# Patient Record
(demographics unavailable — no encounter records)

---

## 2018-01-06 NOTE — EMERGENCY DEPARTMENT REPORT
HPI





- General


Chief Complaint: Upper Respiratory Infection


Time Seen by Provider: 18 12:05





- HPI


HPI: 








The patient is a  EGA 10 weeks, who presents for evaluation of cough and 

ill feeling.  The patient reports that nonproductive cough for the past one day

, harsh in quality, severe, and associated with nausea, decreased appetite, 

generalized myalgias, and fever.  The patient denies headache, neck stiffness, 

trauma to the chest, chest pain, syncope, hemoptysis, unilateral leg swelling, 

abdominal pain, vaginal bleeding, vaginal discharge, dysuria.








ED Past Medical Hx





- Past Medical History


Previous Medical History?: Yes





- Surgical History


Past Surgical History?: Yes


Additional Surgical History: 





- Social History


Smoking Status: Never Smoker


Substance Use Type: Alcohol, Non Opiate Pain





- Medications


Home Medications: 


 Home Medications











 Medication  Instructions  Recorded  Confirmed  Last Taken  Type


 


Acetaminophen [Tylenol Extra 500 mg PO Q6HR #30 tablet 18  Unknown Rx





Strength]     


 


Oseltamivir [Tamiflu] 75 mg PO BID #10 cap 18  Unknown Rx


 


Pnv No.95/Ferrous Fum/Folic AC 1 each PO QDAY #31 tablet 18  Unknown Rx





[Prenatal Vitamin Tablet]     


 


guaiFENesin [Guaifenesin] 200 mg PO Q8HR PRN #30 tablet 18  Unknown Rx














ED Review of Systems


ROS: 


Stated complaint: FLU LIKE SYMPTOMS


Other details as noted in HPI








Constitutional: denies: fever


ENT: denies: throat or neck pain


Respiratory: reports cough denies: shortness of breath


Cardiovascular: denies: chest pain


Endocrine: denies unexplained weight loss or gain


Gastrointestinal: denies: abdominal pain, nausea


Genitourinary: denies: dysuria


Musculoskeletal: reports myalgias denies: leg swelling


Skin: denies: rash


Neurological: denies: headache


Hematological/Lymphatic: denies: easy bleeding or easy bruising  


Psych: denies sadness or hopelessness











Physical Exam





- Physical Exam


Vital Signs: 


 Vital Signs











  18





  10:28 10:59 14:01


 


Temperature 99.2 F 99.3 F 


 


Pulse Rate 129 H 129 H 108 H


 


Respiratory 16 18 16





Rate   


 


Blood Pressure 104/72  


 


Blood Pressure  100/59 90/56





[Left]   


 


O2 Sat by Pulse 100 100 100





Oximetry   











Physical Exam: 








General: well-nourished, well-developed, no acute distress


Head: Normocephalic, atraumatic


Eyes: normal sclera


ENT: Mucous membranes are pale and dry


Neck: No neck stiffness, no cervical adenopathy


Respiratory: Breath sounds equal bilaterally, no wheezing, rales, or rhonchi


Cardio: S1 and S2 present, no murmurs, rubs, gallops, capillary refill is 

delayed


Abdomen: Normoactive bowel sounds, soft abdomen, no tenderness


Chest WALL/Back: No tenderness to palpation of the chest wall, no CVA 

tenderness with percussion


Musc: No pitting edema


Skin: No rash


Neuro: no facial drooping, normal speech


Psych: Normal affect








ED Course


 Vital Signs











  18





  10:28 10:59 14:01


 


Temperature 99.2 F 99.3 F 


 


Pulse Rate 129 H 129 H 108 H


 


Respiratory 16 18 16





Rate   


 


Blood Pressure 104/72  


 


Blood Pressure  100/59 90/56





[Left]   


 


O2 Sat by Pulse 100 100 100





Oximetry   














ED Medical Decision Making





- Lab Data


Result diagrams: 


 18 11:51





 18 11:51





- Medical Decision Making








The patient was seen and examined by myself.  The patient is placed on a 

cardiac monitor and continuous pulse ox. On initial evaluation, the patient was 

found to be in no distress. Evaluation orders were placed. The patient is given 

2 L normal saline fluid bolus for treatment of dehydration and tachycardia.  

The patient given Rocephin and sent for her cough and Tylenol for her pain.  

Lab results reveal positive influenza type A screen.  The patient is given 

Tamiflu.  The patient was reevaluated and reported that their symptoms were 

markedly improved. On reexamination the patient is found to have normal 

respiratory rate, O2 sat on pulse oximetry, with no costal retractions or 

diminishment of breath sounds on auscultation, and decrease in heart rate from 

130s on arrival to 103 currently.  The patient is stable for discharge with 

outpatient follow-up.  The patient is given follow-up and return instructions.  

The patient expressed understanding and agreed with the plan.  The patient is 

discharged in stable condition.








Critical care attestation.: 


If time is entered above; I have spent that time in minutes in the direct care 

of this critically ill patient, excluding procedure time.








ED Disposition


Clinical Impression: 


 Influenza A, Dehydration





Disposition: DC-01 TO HOME OR SELFCARE


Is pt being admited?: No


Does the pt Need Aspirin: No


Condition: Stable


Instructions:  Influenza (ED), Dehydration (ED), Musculoskeletal Pain (ED)


Prescriptions: 


Acetaminophen [Tylenol Extra Strength] 500 mg PO Q6HR #30 tablet


guaiFENesin [Guaifenesin] 200 mg PO Q8HR PRN #30 tablet


 PRN Reason: Cough


Oseltamivir [Tamiflu] 75 mg PO BID #10 cap


Referrals: 


PRIMARY CARE,MD [Primary Care Provider] - 3-5 Days


Time of Disposition: 15:31